# Patient Record
(demographics unavailable — no encounter records)

---

## 2025-03-03 NOTE — DISCUSSION/SUMMARY
[de-identified] : Chief complaint: Bilateral elbow pain/injury  HPI: Patient is a 13-year-old male who presents to the office today accompanied by his mother for the evaluation of injuries to the bilateral elbow sustained on Friday, 2/28/2025.  Patient reports that he was playing football when he fell.  In an attempt to break his fall he outstretched both of his arms.  He sustained injuries to the bilateral elbows.  He was brought to [facility] at which time 2 view x-rays of the bilateral forearms and 3 view x-rays of the bilateral elbows were performed impression as per the radiologist of acute nondisplaced bilateral radial neck fractures.  Patient was placed in bilateral long-arm splints with overlying Ace wrap and was told to follow-up with orthopedics.  ROS: Positive for bilateral elbow injury  Physical examination of the bilateral elbows:  There is mild edema noted bilaterally No erythema No ecchymosis Skin is intact Patient is able to extend fully bilaterally Range of motion in bilateral flexion, supination, and pronation limited There is no appreciable tenderness over the medial epicondyle, lateral epicondyle, olecranon process bilaterally Tenderness over the radial head bilaterally Good range of motion to the wrists bilaterally as well as to the hands bilaterally Distal sensation grossly intact to light touch Capillary refill less than 2 seconds  Patient was brought to [facility] on 2/28/2025 at which time 2 view x-rays of the bilateral forearms and 3 view x-rays of the bilateral elbows were performed with impression as per the radiologist: Acute nondisplaced bilateral radial neck fractures.  Assessment/plan: Acute closed nondisplaced bilateral radial neck fractures, this case was discussed with Dr. East reviewed the patient's x-rays from [facility], at this time bilateral long-arm casts were recommended by Dr. East  1.  Today the patient was placed in a fiberglass long arm cast to the BILATERAL ARMS extending from the proximal aspect of the upper arm to the hand, The cast was molded over the fracture site, Patient and his mother were advised on cast care, Advised that the cast cannot get wet or it will have to be replaced, recommended the purchase of a cast guard or cast cover for use while showering / bathing 2.  Patient can be given over-the-counter children's Tylenol or Motrin on an as-needed basis for pain 3.  Discussed activity modifications with the patient with his mother, advised no gym or sports at this time  Patient will be provided with a 3-week follow-up with Dr. East for repeat evaluation, the patient and his mother verbalized understanding of all findings in the office today, they agree to follow-up as directed

## 2025-03-28 NOTE — DATA REVIEWED
[Acceptable Fracture Allignment] : fracture alignment remains acceptable [de-identified] :  3 views of BL elbow x-rays reviewed.

## 2025-03-28 NOTE — ASSESSMENT
[FreeTextEntry1] : Cast removed today. Follow up with [facility] or Michelle in 2-3 weeks for ROM check, Then follow up with me in 3-4 months for physis check.

## 2025-03-28 NOTE — HISTORY OF PRESENT ILLNESS
[FreeTextEntry1] : 14 y/o male here with bilateral radial neck fractures after a fall while playing football on 2/28/25. Patient was last seen by [facility] on 3/3/25 and was placed into fiberglass long arm casts bilaterally.

## 2025-03-28 NOTE — DATA REVIEWED
[Acceptable Fracture Allignment] : fracture alignment remains acceptable [de-identified] :  3 views of BL elbow x-rays reviewed.

## 2025-03-28 NOTE — HISTORY OF PRESENT ILLNESS
[FreeTextEntry1] : 12 y/o male here with bilateral radial neck fractures after a fall while playing football on 2/28/25. Patient was last seen by [facility] on 3/3/25 and was placed into fiberglass long arm casts bilaterally.